# Patient Record
Sex: MALE | Race: BLACK OR AFRICAN AMERICAN | NOT HISPANIC OR LATINO | URBAN - METROPOLITAN AREA
[De-identification: names, ages, dates, MRNs, and addresses within clinical notes are randomized per-mention and may not be internally consistent; named-entity substitution may affect disease eponyms.]

---

## 2017-09-22 ENCOUNTER — FOLLOW UP ESTABLISHED (OUTPATIENT)
Dept: URBAN - METROPOLITAN AREA CLINIC 30 | Facility: CLINIC | Age: 42
End: 2017-09-22
Payer: COMMERCIAL

## 2017-09-22 DIAGNOSIS — H04.123 DRY EYE SYNDROME OF BILATERAL LACRIMAL GLANDS: Primary | ICD-10-CM

## 2017-09-22 PROCEDURE — 92015 DETERMINE REFRACTIVE STATE: CPT | Performed by: OPTOMETRIST

## 2017-09-22 PROCEDURE — 92014 COMPRE OPH EXAM EST PT 1/>: CPT | Performed by: OPTOMETRIST

## 2017-09-22 ASSESSMENT — INTRAOCULAR PRESSURE
OD: 12
OS: 11

## 2017-09-22 ASSESSMENT — VISUAL ACUITY
OS: 20/20
OD: 20/20

## 2019-10-15 ENCOUNTER — FOLLOW UP ESTABLISHED (OUTPATIENT)
Dept: URBAN - METROPOLITAN AREA CLINIC 30 | Facility: CLINIC | Age: 44
End: 2019-10-15
Payer: COMMERCIAL

## 2019-10-15 DIAGNOSIS — H52.4 PRESBYOPIA: Primary | ICD-10-CM

## 2019-10-15 PROCEDURE — 92015 DETERMINE REFRACTIVE STATE: CPT | Performed by: OPTOMETRIST

## 2019-10-15 PROCEDURE — 92014 COMPRE OPH EXAM EST PT 1/>: CPT | Performed by: OPTOMETRIST

## 2019-10-15 RX ORDER — POLYVINYL ALCOHOL 14 MG/ML
1.4 % SOLUTION/ DROPS OPHTHALMIC
Qty: 0 | Refills: 0 | Status: ACTIVE
Start: 2019-10-15

## 2019-10-15 ASSESSMENT — INTRAOCULAR PRESSURE
OS: 12
OD: 13

## 2019-10-15 ASSESSMENT — VISUAL ACUITY
OD: 20/20
OS: 20/20

## 2019-10-15 ASSESSMENT — KERATOMETRY
OS: 43.35
OD: 43.52

## 2023-01-03 ENCOUNTER — OFFICE VISIT (OUTPATIENT)
Dept: URBAN - METROPOLITAN AREA CLINIC 30 | Facility: CLINIC | Age: 48
End: 2023-01-03
Payer: COMMERCIAL

## 2023-01-03 DIAGNOSIS — H52.4 PRESBYOPIA: ICD-10-CM

## 2023-01-03 DIAGNOSIS — H04.123 DRY EYE SYNDROME OF BILATERAL LACRIMAL GLANDS: Primary | ICD-10-CM

## 2023-01-03 PROCEDURE — 92004 COMPRE OPH EXAM NEW PT 1/>: CPT | Performed by: OPTOMETRIST

## 2023-01-03 PROCEDURE — 92134 CPTRZ OPH DX IMG PST SGM RTA: CPT | Performed by: OPTOMETRIST

## 2023-01-03 RX ORDER — PILOCARPINE HYDROCHLORIDE 12.5 MG/ML
1.25 % SOLUTION/ DROPS OPHTHALMIC
Qty: 2.5 | Refills: 11 | Status: ACTIVE
Start: 2023-01-03

## 2023-01-03 ASSESSMENT — INTRAOCULAR PRESSURE
OD: 15
OS: 15

## 2023-01-03 ASSESSMENT — KERATOMETRY
OS: 43.49
OD: 43.63

## 2023-01-03 ASSESSMENT — VISUAL ACUITY
OS: 20/20
OD: 20/20

## 2023-01-03 NOTE — IMPRESSION/PLAN
Impression: Presbyopia Plan: Ordered last rx online and they felt too strong. Went back to otc readers usually +1.75. Possible rx made incorrectly online. Discussed PALs, pt is in [employer] school, discussed ranges. Pt educ on presbyopia. Pt also asked about Vuity, discussed possible side effects, rx issued.

## 2023-01-03 NOTE — IMPRESSION/PLAN
Impression: Tear film insufficiency of bilateral lacrimal glands +MGD Plan: Pt is using ATs BID OU and allergy medications. Previously discussed how allergy meds can worsen dryness. Using Flonase ~ 3x/week and Zyrtec. Discussed using Flonase prn due to potential to raise IOP- in normal range today. Again discussed drying effects of allergy meds. Using humidifier. Rec fish oil, henry qhs, and blink breaks during reading- pt is a student reading textbooks throughout the day. Dry eye handout given to pt today.